# Patient Record
Sex: MALE | Employment: UNEMPLOYED | URBAN - METROPOLITAN AREA
[De-identification: names, ages, dates, MRNs, and addresses within clinical notes are randomized per-mention and may not be internally consistent; named-entity substitution may affect disease eponyms.]

---

## 2020-01-01 ENCOUNTER — TELEPHONE (OUTPATIENT)
Dept: EMERGENCY MEDICINE | Facility: CLINIC | Age: 30
End: 2020-01-01

## 2020-01-01 ENCOUNTER — APPOINTMENT (OUTPATIENT)
Dept: GENERAL RADIOLOGY | Facility: CLINIC | Age: 30
End: 2020-01-01
Attending: EMERGENCY MEDICINE

## 2020-01-01 ENCOUNTER — HOSPITAL ENCOUNTER (EMERGENCY)
Facility: CLINIC | Age: 30
Discharge: HOME | End: 2020-03-09
Attending: EMERGENCY MEDICINE | Admitting: EMERGENCY MEDICINE

## 2020-01-01 ENCOUNTER — HOSPITAL ENCOUNTER (EMERGENCY)
Facility: CLINIC | Age: 30
Discharge: HOME | End: 2020-01-01
Attending: EMERGENCY MEDICINE | Admitting: EMERGENCY MEDICINE

## 2020-01-01 ENCOUNTER — APPOINTMENT (OUTPATIENT)
Dept: ULTRASOUND IMAGING | Facility: CLINIC | Age: 30
End: 2020-01-01
Attending: EMERGENCY MEDICINE

## 2020-01-01 VITALS
RESPIRATION RATE: 18 BRPM | OXYGEN SATURATION: 100 % | WEIGHT: 250 LBS | DIASTOLIC BLOOD PRESSURE: 87 MMHG | HEART RATE: 88 BPM | SYSTOLIC BLOOD PRESSURE: 137 MMHG | TEMPERATURE: 96.8 F

## 2020-01-01 VITALS
HEART RATE: 74 BPM | TEMPERATURE: 98.3 F | SYSTOLIC BLOOD PRESSURE: 147 MMHG | DIASTOLIC BLOOD PRESSURE: 77 MMHG | RESPIRATION RATE: 16 BRPM | OXYGEN SATURATION: 100 %

## 2020-01-01 DIAGNOSIS — J02.9 SORE THROAT: ICD-10-CM

## 2020-01-01 DIAGNOSIS — N45.1 EPIDIDYMITIS: ICD-10-CM

## 2020-01-01 DIAGNOSIS — N50.811 TESTICULAR PAIN, RIGHT: ICD-10-CM

## 2020-01-01 DIAGNOSIS — J06.9 UPPER RESPIRATORY TRACT INFECTION, UNSPECIFIED TYPE: ICD-10-CM

## 2020-01-01 DIAGNOSIS — Z20.2 STD EXPOSURE: ICD-10-CM

## 2020-01-01 DIAGNOSIS — R07.9 CHEST PAIN, UNSPECIFIED TYPE: ICD-10-CM

## 2020-01-01 LAB
ALBUMIN UR-MCNC: NEGATIVE MG/DL
APPEARANCE UR: CLEAR
BILIRUB UR QL STRIP: NEGATIVE
C TRACH DNA SPEC QL NAA+PROBE: NEGATIVE
C TRACH DNA SPEC QL NAA+PROBE: NEGATIVE
COLOR UR AUTO: ABNORMAL
DEPRECATED S PYO AG THROAT QL EIA: NEGATIVE
FLUAV+FLUBV AG SPEC QL: NEGATIVE
FLUAV+FLUBV AG SPEC QL: NEGATIVE
GLUCOSE UR STRIP-MCNC: NEGATIVE MG/DL
HGB UR QL STRIP: NEGATIVE
KETONES UR STRIP-MCNC: NEGATIVE MG/DL
LEUKOCYTE ESTERASE UR QL STRIP: ABNORMAL
N GONORRHOEA DNA SPEC QL NAA+PROBE: NEGATIVE
N GONORRHOEA DNA SPEC QL NAA+PROBE: POSITIVE
NITRATE UR QL: NEGATIVE
PH UR STRIP: 7.5 PH (ref 5–7)
RBC #/AREA URNS AUTO: 2 /HPF (ref 0–2)
SOURCE: ABNORMAL
SP GR UR STRIP: 1.02 (ref 1–1.03)
SPECIMEN SOURCE: ABNORMAL
SPECIMEN SOURCE: NORMAL
STREP GROUP A PCR: NOT DETECTED
UROBILINOGEN UR STRIP-MCNC: NORMAL MG/DL (ref 0–2)
WBC #/AREA URNS AUTO: 14 /HPF (ref 0–5)

## 2020-01-01 PROCEDURE — 71046 X-RAY EXAM CHEST 2 VIEWS: CPT

## 2020-01-01 PROCEDURE — 87591 N.GONORRHOEAE DNA AMP PROB: CPT | Performed by: EMERGENCY MEDICINE

## 2020-01-01 PROCEDURE — 96372 THER/PROPH/DIAG INJ SC/IM: CPT

## 2020-01-01 PROCEDURE — 87491 CHLMYD TRACH DNA AMP PROBE: CPT | Performed by: EMERGENCY MEDICINE

## 2020-01-01 PROCEDURE — 76870 US EXAM SCROTUM: CPT

## 2020-01-01 PROCEDURE — 87804 INFLUENZA ASSAY W/OPTIC: CPT | Performed by: EMERGENCY MEDICINE

## 2020-01-01 PROCEDURE — 40001204 ZZHCL STATISTIC STREP A RAPID: Performed by: EMERGENCY MEDICINE

## 2020-01-01 PROCEDURE — 99285 EMERGENCY DEPT VISIT HI MDM: CPT | Mod: 25

## 2020-01-01 PROCEDURE — 25000128 H RX IP 250 OP 636: Performed by: EMERGENCY MEDICINE

## 2020-01-01 PROCEDURE — 87804 INFLUENZA ASSAY W/OPTIC: CPT | Mod: 59 | Performed by: EMERGENCY MEDICINE

## 2020-01-01 PROCEDURE — 99284 EMERGENCY DEPT VISIT MOD MDM: CPT | Mod: 25

## 2020-01-01 PROCEDURE — 81001 URINALYSIS AUTO W/SCOPE: CPT | Performed by: EMERGENCY MEDICINE

## 2020-01-01 PROCEDURE — 25000132 ZZH RX MED GY IP 250 OP 250 PS 637: Performed by: EMERGENCY MEDICINE

## 2020-01-01 PROCEDURE — 87651 STREP A DNA AMP PROBE: CPT | Performed by: EMERGENCY MEDICINE

## 2020-01-01 RX ORDER — IBUPROFEN 800 MG/1
800 TABLET, FILM COATED ORAL ONCE
Status: COMPLETED | OUTPATIENT
Start: 2020-01-01 | End: 2020-01-01

## 2020-01-01 RX ORDER — CEFTRIAXONE SODIUM 250 MG
250 VIAL (EA) INJECTION ONCE
Status: COMPLETED | OUTPATIENT
Start: 2020-01-01 | End: 2020-01-01

## 2020-01-01 RX ORDER — OXYCODONE HYDROCHLORIDE 5 MG/1
5 TABLET ORAL ONCE
Status: COMPLETED | OUTPATIENT
Start: 2020-01-01 | End: 2020-01-01

## 2020-01-01 RX ORDER — AZITHROMYCIN 250 MG/1
1000 TABLET, FILM COATED ORAL ONCE
Status: COMPLETED | OUTPATIENT
Start: 2020-01-01 | End: 2020-01-01

## 2020-01-01 RX ORDER — ACETAMINOPHEN 500 MG
1000 TABLET ORAL ONCE
Status: COMPLETED | OUTPATIENT
Start: 2020-01-01 | End: 2020-01-01

## 2020-01-01 RX ORDER — IBUPROFEN 600 MG/1
600 TABLET, FILM COATED ORAL ONCE
Status: COMPLETED | OUTPATIENT
Start: 2020-01-01 | End: 2020-01-01

## 2020-01-01 RX ORDER — OXYCODONE HYDROCHLORIDE 5 MG/1
5 TABLET ORAL EVERY 6 HOURS PRN
Qty: 12 TABLET | Refills: 0 | Status: SHIPPED | OUTPATIENT
Start: 2020-01-01

## 2020-01-01 RX ORDER — DOXYCYCLINE 100 MG/1
100 CAPSULE ORAL 2 TIMES DAILY
Qty: 20 CAPSULE | Refills: 0 | Status: SHIPPED | OUTPATIENT
Start: 2020-01-01 | End: 2020-01-01

## 2020-01-01 RX ORDER — BENZONATATE 100 MG/1
100 CAPSULE ORAL 3 TIMES DAILY PRN
Qty: 15 CAPSULE | Refills: 0 | Status: SHIPPED | OUTPATIENT
Start: 2020-01-01

## 2020-01-01 RX ORDER — IBUPROFEN 800 MG/1
800 TABLET, FILM COATED ORAL EVERY 8 HOURS PRN
Qty: 18 TABLET | Refills: 0 | Status: SHIPPED | OUTPATIENT
Start: 2020-01-01 | End: 2020-01-01

## 2020-01-01 RX ORDER — ACETAMINOPHEN 500 MG
500-1000 TABLET ORAL EVERY 8 HOURS PRN
Qty: 1 TABLET | Refills: 0 | Status: SHIPPED | OUTPATIENT
Start: 2020-01-01 | End: 2020-01-01

## 2020-01-01 RX ORDER — IBUPROFEN 600 MG/1
600 TABLET, FILM COATED ORAL EVERY 8 HOURS PRN
Qty: 30 TABLET | Refills: 0 | Status: SHIPPED | OUTPATIENT
Start: 2020-01-01

## 2020-01-01 RX ADMIN — IBUPROFEN 600 MG: 600 TABLET ORAL at 13:03

## 2020-01-01 RX ADMIN — IBUPROFEN 800 MG: 800 TABLET ORAL at 19:32

## 2020-01-01 RX ADMIN — CEFTRIAXONE SODIUM 250 MG: 250 INJECTION, POWDER, FOR SOLUTION INTRAMUSCULAR; INTRAVENOUS at 21:42

## 2020-01-01 RX ADMIN — CEFTRIAXONE SODIUM 250 MG: 250 INJECTION, POWDER, FOR SOLUTION INTRAMUSCULAR; INTRAVENOUS at 13:03

## 2020-01-01 RX ADMIN — ACETAMINOPHEN 1000 MG: 500 TABLET, FILM COATED ORAL at 19:33

## 2020-01-01 RX ADMIN — AZITHROMYCIN MONOHYDRATE 1000 MG: 250 TABLET ORAL at 13:03

## 2020-01-01 RX ADMIN — OXYCODONE HYDROCHLORIDE 5 MG: 5 TABLET ORAL at 19:32

## 2020-01-01 ASSESSMENT — ENCOUNTER SYMPTOMS
JOINT SWELLING: 1
DYSURIA: 1
DIAPHORESIS: 1
DIARRHEA: 1
CHILLS: 1
ABDOMINAL PAIN: 1
SORE THROAT: 1
ARTHRALGIAS: 1
COUGH: 1
VOMITING: 0
FEVER: 0
FEVER: 1
NAUSEA: 0

## 2020-01-01 NOTE — ED AVS SNAPSHOT
[facility]  [address]  [facility] [address]  Phone:  [phone]  Fax:  [phone]                                    Harry Hoffman   MRN: [id number]    Department:  [facility]   Date of Visit:  1/1/2020           After Visit Summary Signature Page    I have received my discharge instructions, and my questions have been answered. I have discussed any challenges I see with this plan with the nurse or doctor.    ..........................................................................................................................................  Patient/Patient Representative Signature      ..........................................................................................................................................  Patient Representative Print Name and Relationship to Patient    ..................................................               ................................................  Date                                   Time    ..........................................................................................................................................  Reviewed by Signature/Title    ...................................................              ..............................................  Date                                               Time          22EPIC Rev 08/18

## 2020-01-02 NOTE — ED PROVIDER NOTES
History     Chief Complaint:  testicle pain      HPI   Harry Hoffman is a 29 year old male who presents with testicle pain. This morning the patient noted left testicular pain while he was sitting. The patient endorses testicular swelling and pain. He notes dysuria when he is finishing urinating. His pain is currently rated a 10/10 and has not taken any medication for pain. He thinks that there might be some penile discharge. He  is concerned for a STI. He denies rash, nausea, vomiting, and fever.    Allergies:  Penicillin   Vicodin     Medications:    The patient is not currently taking any prescribed medications.    Past Medical History:    Acute shoulder pain  Acute knee pain     Past Surgical History:    The patient does not have any pertinent past surgical history.    Family History:    Mother: cancer     Social History:  The patient was accompanied to the ED by spouse.  Smoking Status: Never Smoker  Smokeless Tobacco: Never Used  Alcohol Use: Yes  Drug Use: marijuana    PCP: Yumi Mccarthy  Marital Status:  Single     Review of Systems   Constitutional: Negative for fever.   Gastrointestinal: Positive for abdominal pain. Negative for nausea and vomiting.   Genitourinary: Positive for discharge, dysuria and testicular pain.   Skin: Negative for rash.   All other systems reviewed and are negative.        Physical Exam     Patient Vitals for the past 24 hrs:   BP Temp Temp src Pulse Heart Rate Resp SpO2 Weight   01/01/20 2300 137/87 -- -- 88 88 18 100 % --   01/01/20 1808 (!) 130/90 96.8  F (36  C) Temporal 87 -- 18 98 % 113.4 kg (250 lb)       Physical Exam    Nursing note and vitals reviewed.    Constitutional: Pleasant and well groomed.          HENT:    Mouth/Throat: Oropharynx is without swelling or erythema. Oral mucosa moist.    Eyes: Conjunctivae are normal. No scleral icterus.    Neck: Neck supple.   Cardiovascular: Normal rate, regular rhythm and intact distal pulses.     Pulmonary/Chest: Effort normal and breath sounds normal.   Abdominal: Soft.  No distension. There is no tenderness.   Musculoskeletal:  No edema, No calf tenderness  Genitourinary: Circumcised penis, No rash. No penile discharge. Enlarged very tender testicle. No overlying erythema. No appreciable hernia.   Neurological:Alert. Coordination normal.   Skin: Skin is warm and dry.   Psychiatric: Normal mood and affect.       Emergency Department Course     Imaging:  Radiology findings were communicated with the patient who voiced understanding of the findings.    US Testicular & Scrotum w Doppler Ltd  1.  Findings suggest left epididymitis.  Reading per radiology    Laboratory:  Laboratory findings were communicated with the patient who voiced understanding of the findings.    UA with microscopic: pH 7.5 (H), leukocyte esterase small, WBC 14 (H) o/w WNL    Chlamydia trachomatis PCR: Pending   Neisseria gonorrhoea PCR: Pending    Interventions:  1932 Ibuprofen 800 mg oral  1932 Oxycodone 5 mg oral  1933 Tylenol 1,000 mg oral   2142 Rocephin 250 mg oral    Emergency Department Course:  Nursing notes and vitals reviewed.    1920 I performed an exam of the patient as documented above.     The patient provided a urine sample here in the emergency department. This was sent for laboratory testing, findings above.    The patient was sent for a US testicular and scrotum while in the emergency department, results above.     2218 I returned to update the patient about their plan for discharge.     Findings and plan explained to the Patient. Patient discharged home with instructions regarding supportive care, medications, and reasons to return. The importance of close follow-up was reviewed. The patient was prescribed tylenol, doxycycline, ibuprofen and oxycodone.      Impression & Plan      Medical Decision Making:  Harry Hoffman is a 29 year old male who presents to the emergency department today for evaluation of  testicular pain and swelling as well as urinary symptoms in the setting of being concerned about a sexually transmitted infection.  Differential diagnosis included but was not limited to STI, epididymitis, testicular torsion, testicular mass, hernia.  There was no findings concerning for overlying cellulitis as well.  Urine was suggestive of infection.  Gonorrhea and Chlamydia are pending at this time.  Ultrasound revealed epididymitis.  With pain management the patient was feeling better.  He received ceftriaxone IM in the emergency department and was started on doxycycline.  He understands that we have not completed the work-up for sexually transmitted infections specifically we did not perform HIV testing.  He also understands that he should not have sexual intercourse until completion of the antibiotics and resolution of the symptoms.  Lastly I have stressed the importance of using condoms to prevent the transmission of sexually transmitted infections.  He is to follow-up in clinic in 2 days either with a primary care physician or urology if not improving as anticipated and return to the emergency department with new or worsening symptoms.  I communicated standard narcotic precautions.      Diagnosis:    ICD-10-CM    1. Epididymitis N45.1    2. Testicular pain, right N50.811        Disposition:   The patient is discharged to home.    Discharge Medications:  Discharge Medication List as of 1/1/2020 11:11 PM      START taking these medications    Details   acetaminophen (TYLENOL) 500 MG tablet Take 1-2 tablets (500-1,000 mg) by mouth every 8 hours as needed for mild pain (DO NOT FILL! For dosing only.) DO NOT FILL!   For Dosing Only, Disp-1 tablet, R-0, Local Print      doxycycline hyclate (VIBRAMYCIN) 100 MG capsule Take 1 capsule (100 mg) by mouth 2 times daily for 10 days, Disp-20 capsule, R-0, Local Print      ibuprofen (ADVIL/MOTRIN) 800 MG tablet Take 1 tablet (800 mg) by mouth every 8 hours as needed for  moderate pain, Disp-18 tablet, R-0, Local Print      oxyCODONE (ROXICODONE) 5 MG tablet Take 1 tablet (5 mg) by mouth every 6 hours as needed for pain, Disp-12 tablet, R-0, Local Print             Scribe Disclosure:  I, Renee Gao, am serving as a scribe at 7:24 PM on 1/1/2020 to document services personally performed by Vera Sanchez M.D. based on my observations and the provider's statements to me.     [facility] EMERGENCY DEPARTMENT       Vera Sanchez MD  01/02/20 0112

## 2020-01-02 NOTE — DISCHARGE INSTRUCTIONS
Ice, scrotal support and elevation.     Opioid Medication Discharge Instructions    You have been given a prescription for an opioid (narcotic) pain medicine and/or have   received a pain medicine while here in the emergency department. These medicines can make you drowsy or impaired.     You must not drive, operate dangerous equipment, or   engage in any other dangerous activities while taking these medications. If you drive while taking these medications, you could be arrested for DUI, or driving under the   influence. Do not drink any alcohol while you are taking these medications.     Opioid pain medications can cause addiction. If you have a history of chemical   dependency of any type, you are at a higher risk of becoming addicted to pain   medications. Only take these prescribed medications to treat your pain when all other   options have been tried. Take it for as short a time and as few doses as possible.     Store your pain pills in a secure place, as they are frequently stolen and provide a dangerous opportunity for children or visitors in your house to start abusing these powerful medications. We will not replace any lost or stolen medicine.     As soon as your pain is better, you should safely dispose of all your remaining medication.     Many prescription pain medications contain Tylenol (acetaminophen), including Vicodin, Tylenol #3, Norco, Lortab, and Percocet. You should not take any extra pills of Tylenol if you are using these prescription medications or you can get very sick. Do not ever take more than 4000 mg of acetaminophen in any 24 hour period.    All opioids tend to cause constipation. Drink plenty of water and eat foods that have   a lot of fiber, such as fruits, vegetables, prune juice, apple juice and high fiber cereal.   Take a laxative if you don t move your bowels at least every other day. Miralax, Milk of   Magnesia, Colace, or Senna can be used to keep you regular.

## 2020-01-02 NOTE — RESULT ENCOUNTER NOTE
Final Chlamydia Trachomatis PCR is NEGATIVE.   No treatment or change in treatment per [place] ED Lab Result protocol.

## 2020-01-02 NOTE — TELEPHONE ENCOUNTER
[facility] Emergency Department Lab result notification:    Reason for call  Inform patient of lab result. Assess as needed.   Lab Result  Final N. Gonorrhoeae PCR is POSITIVE.   Patient was treated appropriately in the ED [Yes or No]:  Yes       If Yes, list what was given in the ED:  Ceftriaxone (Rocephin) 250 mg IM x 1 AND Doxycycline 100 mg PO tablet, 1 tablet (100 mg) by mouth 2 times daily for 10 days  If treated appropriately in the [place] ED, notify patient of result and STD instructions.  ED visit Date: 1/1/20  Symptoms reported at ED visit Harry Hoffman is a 29 year old male who presents to the emergency department today for evaluation of testicular pain and swelling as well as urinary symptoms in the setting of being concerned about a sexually transmitted infection.  Differential diagnosis included but was not limited to STI, epididymitis, testicular torsion, testicular mass, hernia.  There was no findings concerning for overlying cellulitis as well.  Urine was suggestive of infection.  Gonorrhea and Chlamydia are pending at this time.  Ultrasound revealed epididymitis.  With pain management the patient was feeling better.  He received ceftriaxone IM in the emergency department and was started on doxycycline.  He understands that we have not completed the work-up for sexually transmitted infections specifically we did not perform HIV testing.  He also understands that he should not have sexual intercourse until completion of the antibiotics and resolution of the symptoms.  Lastly I have stressed the importance of using condoms to prevent the transmission of sexually transmitted infections.  He is to follow-up in clinic in 2 days either with a primary care physician or urology if not improving as anticipated and return to the emergency department with new or worsening symptoms.  I communicated standard narcotic precautions.     Diagnosis:      ICD-10-CM     1. Epididymitis N45.1      2. Testicular pain, right N50.811        Miscellaneous information      Recommendations/Instructions  He was given the appropriate treatment in the ED before he left. I advised he finish the 10 days of doxycycline as prescribed before he has intercourse. He has already made a f/u appt with his clinic on Monday. Questions answered.   STD Patient Instructions:    We recommend that you contact any recent sexual partners within the last 2 months and have them evaluated by a physician.    Avoid sexual activity for 7 to 10 days or until both your and your partner(s) have completed all antibiotic medications.    We advise that you consider following up with your PCP at approximately 3 months for retesting to be sure the infection has cleared.      Contact your PCP clinic or return to the Emergency department if your:    Symptoms return.    Symptoms do not improved after 3 days on antibiotic.    Symptoms do not resolve after completing antibiotic.    Symptoms worsen or other concerning symptom's.      Ana Zamudio RN  [email or web address]   Lung Nodule and ED Lab Result F/U RN  Epic pool (ED late result f/u RN): P [id number]  # [phone]

## 2020-01-02 NOTE — RESULT ENCOUNTER NOTE
Final N. Gonorrhoeae PCR is POSITIVE.   Patient was treated appropriately in the ED [Yes or No]:  Yes       If Yes, list what was given in the ED:  Ceftriaxone (Rocephin) 250 mg IM x 1 AND Doxycycline 100 mg PO tablet, 1 tablet (100 mg) by mouth 2 times daily for 10 days  If treated appropriately in the [place] ED, notify patient of result and STD instructions.

## 2020-03-09 NOTE — DISCHARGE INSTRUCTIONS

## 2020-03-09 NOTE — ED PROVIDER NOTES
History     Chief Complaint:  Pharyngitis      HPI   Harry Hoffman is a 30 year old male who presents with sore throat. The patient says the sore throat started on 3/6 and has been getting worse since then. He says that the pain is worse with swallowing or coughing. He says that when he coughs the pain goes from his shoulder to his chest. The patient says that he has not measured a fever, but he does feel warm. He endorses chills, night sweats, diarrhea, and body aches. He denies getting a flu shot this year or recent travel. Of note, the patient says that one of his sexual partners told him they tested positive for chlamydia on 3/7, he says that the last time he had intercourse with that partner was earlier that week. He denies any current penile drainage or pain.       Allergies:  Penicillins   Vicodin      Medications:    Flexeril  Roxicodone  Percocet      Past Medical History:    Past medical history reviewed. No pertinent medical history.     Past Surgical History:    Surgical history reviewed. No pertinent surgical history.     Family History:    Multiple myeloma  Diabetes   Stomach cancer     Social History:  Smoking Status: Never Smoker  Smokeless Tobacco: Never Used  Alcohol Use: negative   Drug Use: Positive   Marital Status:  Single      Review of Systems   Constitutional: Positive for chills, diaphoresis and fever.   HENT: Positive for sore throat.    Respiratory: Positive for cough.    Gastrointestinal: Positive for diarrhea.   Genitourinary: Negative for discharge and penile pain.   Musculoskeletal: Positive for arthralgias and joint swelling.   All other systems reviewed and are negative.        Physical Exam     Patient Vitals for the past 24 hrs:   BP Temp Temp src Pulse Heart Rate Resp SpO2   03/09/20 1130 -- -- -- -- -- -- 99 %   03/09/20 1057 (!) 154/90 98.3  F (36.8  C) Oral 80 80 16 99 %        Physical Exam  General: Resting comfortably on the gurney, wearing a mask  Head:  The  scalp, face, and head appear normal  Eyes:  The pupils are equal, round, and reactive to light    There is no nystagmus    Extraocular muscles are intact    Conjunctivae and sclerae are normal  ENT:    The nose is normal    Pinnae are normal    The oropharynx reveals marked erythema to the pallitoe glossal arches. No exudates     Uvula is in the midline, slightly elongated and red  Neck:  Normal range of motion    There is no rigidity noted    There is no midline cervical spine pain/tenderness    Trachea is in the midline    No mass is detected  CV:  Regular rate and underlying rhythm     Normal S1/S2, no S3/S4    No pathological murmur detected  Resp:  Lungs are clear    There is no tachypnea    Non-labored    No rales    No wheezing   GI:  Abdomen is soft, there is no rigidity    No distension    No tympani    No rebound tenderness     Non-surgical without peritoneal features    Normal penis, circumcised. No discharge. testicles normal  MS:  Normal muscular tone    Symmetric motor strength    No major joint effusions    No asymmetric leg swelling, no calf tenderness  Skin:  No rash or acute skin lesions noted  Neuro: Speech is normal and fluent  Psych:  Awake. Alert.      Normal affect.  Appropriate interactions.  Lymph: No anterior cervical lymphadenopathy noted        Emergency Department Course     Imaging:  Radiology findings were communicated with the patient who voiced understanding of the findings.    XR Chest 2 Views  Negative chest. Lungs clear.  ANGELES DE LA CRUZ MD  Reading per radiology    Laboratory:  Laboratory findings were communicated with the patient who voiced understanding of the findings.    Streptococcus A Rapid Scr w Reflx to PCR: negative  Group A Streptococcus PCR Throat Swab pending  Influenza A/B antigen: negative       Chlamydia Trachomatis PCR: pending   Neisseria Gonorrhea PCR: pending      Interventions:  1303 Zithromax 1000 mg Oral    Ibuprofen 600 mg Oral    Rocephin 250 mg      Emergency Department Course:    1058 Nursing notes and vitals reviewed.    1111 I performed an exam of the patient as documented above.     1125 The patient was sent for a XR while in the emergency department, results above.  A throat sample was obtained for laboratory testing as documented above.     1255 Patient rechecked and updated.       The patient is discharged to home.     Impression & Plan      Medical Decision Making:  Harry Hoffman is a 30 year old male who presents to the emergency department today for evaluation of sore throat and cough.  He also has chest discomfort when he coughs.  There is no definitive pleuritic component.  He clearly has an upper respiratory tract infection typified by nasal congestion, pharyngeal erythema, and cough.  In addition, since he is here, he was concerned about an STD exposure with possible chlamydia.  There is no urethral discharge or evidence of epididymitis.  He was treated empirically for gonorrhea and chlamydia pending his urine PCR's.    There is no evidence of acute pneumonia or streptococcal pharyngitis.  Chest x-ray shows no evidence of pneumonia and clinical exam does not reveal any crackles to suggest lobar pneumonia.  He likely has a viral type bronchitis.  He has no history of COVID19 exposure.        Diagnosis:    ICD-10-CM    1. STD exposure  Z20.2    2. Upper respiratory tract infection, unspecified type  J06.9    3. Sore throat  J02.9    4. Chest pain, unspecified type  R07.9      Disposition:   Findings and plan explained to the Patient. Patient discharged home with instructions regarding supportive care, medications, and reasons to return. The importance of close follow-up was reviewed.     Discharge Medications:  New Prescriptions    IBUPROFEN (ADVIL/MOTRIN) 600 MG TABLET    Take 1 tablet (600 mg) by mouth every 8 hours as needed for moderate pain       Scribe Disclosure:  Brandon HERNANDEZ, am serving as a scribe at 11:06 AM on  3/9/2020 to document services personally performed by Carlos Mendiola MD based on my observations and the provider's statements to me.        EMERGENCY DEPARTMENT       Carlos Mendiola MD  03/09/20 4015

## 2020-03-09 NOTE — ED AVS SNAPSHOT
Emergency Department  [address] [place] [address]  Phone:  [phone]  Fax:  [phone]                                    Harry Hoffman   MRN: [id number]    Department:   Emergency Department   Date of Visit:  3/9/2020           After Visit Summary Signature Page    I have received my discharge instructions, and my questions have been answered. I have discussed any challenges I see with this plan with the nurse or doctor.    ..........................................................................................................................................  Patient/Patient Representative Signature      ..........................................................................................................................................  Patient Representative Print Name and Relationship to Patient    ..................................................               ................................................  Date                                   Time    ..........................................................................................................................................  Reviewed by Signature/Title    ...................................................              ..............................................  Date                                               Time          22EPIC Rev 08/18